# Patient Record
Sex: FEMALE | Employment: UNEMPLOYED | URBAN - METROPOLITAN AREA
[De-identification: names, ages, dates, MRNs, and addresses within clinical notes are randomized per-mention and may not be internally consistent; named-entity substitution may affect disease eponyms.]

---
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
## 2024-06-14 ENCOUNTER — OFFICE VISIT (OUTPATIENT)
Dept: PRIMARY CARE CLINIC | Facility: CLINIC | Age: 19
End: 2024-06-14
Payer: MEDICAID

## 2024-06-14 VITALS
WEIGHT: 135 LBS | DIASTOLIC BLOOD PRESSURE: 81 MMHG | SYSTOLIC BLOOD PRESSURE: 125 MMHG | HEART RATE: 83 BPM | RESPIRATION RATE: 16 BRPM | TEMPERATURE: 97.1 F | BODY MASS INDEX: 27.21 KG/M2 | HEIGHT: 59 IN | OXYGEN SATURATION: 97 %

## 2024-06-14 DIAGNOSIS — R93.5 ABNORMAL ABDOMINAL CT SCAN: Primary | ICD-10-CM

## 2024-06-14 PROCEDURE — 99212 OFFICE O/P EST SF 10 MIN: CPT | Performed by: FAMILY MEDICINE

## 2024-06-14 ASSESSMENT — PATIENT HEALTH QUESTIONNAIRE - PHQ9
SUM OF ALL RESPONSES TO PHQ QUESTIONS 1-9: 0
SUM OF ALL RESPONSES TO PHQ9 QUESTIONS 1 & 2: 0
SUM OF ALL RESPONSES TO PHQ QUESTIONS 1-9: 0
SUM OF ALL RESPONSES TO PHQ QUESTIONS 1-9: 0
2. FEELING DOWN, DEPRESSED OR HOPELESS: NOT AT ALL
SUM OF ALL RESPONSES TO PHQ QUESTIONS 1-9: 0
1. LITTLE INTEREST OR PLEASURE IN DOING THINGS: NOT AT ALL

## 2024-06-14 NOTE — PROGRESS NOTES
\"Have you been to the ER, urgent care clinic since your last visit?  Hospitalized since your last visit?\"    NO    “Have you seen or consulted any other health care providers outside of [facility] since your last visit?”    NO            Click Here for Release of Records Request

## 2024-06-14 NOTE — PROGRESS NOTES
Subjective  Johnna Feldman is an 18 y.o. female who presents for referral.     Following up today.  She was evaluated by GI and advised to follow up with a tertiary center. She's been referred to [facility] but has been unable to schedule.    CT abd : IMPRESSION:  1.  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  2.  Nonspecific mild intrahepatic biliary ductal dilatation, consider  correlation with biliary laboratory values.      Allergies - reviewed:   No Known Allergies      Medications - reviewed:   No current outpatient medications on file.     No current facility-administered medications for this visit.         ROS  CONSTITUTIONAL: Denies fever, chills, unintentional weight loss.  CARDIOVASCULAR: Denies chest pain.  RESPIRATORY: Denies cough, dyspnea, wheezing, hemoptysis.      Physical Exam  /81 (Site: Right Upper Arm, Position: Sitting, Cuff Size: Medium Adult)   Pulse 83   Temp 97.1 °F (36.2 °C) (Temporal)   Resp 16   Ht 1.499 m (4' 11\")   Wt 61.2 kg (135 lb)   LMP 05/18/2024 (Exact Date)   SpO2 97%   BMI 27.27 kg/m²   Physical Exam  Vitals reviewed.   Constitutional:       Appearance: Normal appearance.   HENT:      Head: Normocephalic and atraumatic.   Eyes:      Conjunctiva/sclera: Conjunctivae normal.   Cardiovascular:      Rate and Rhythm: Normal rate and regular rhythm.      Heart sounds: Normal heart sounds.   Pulmonary:      Effort: Pulmonary effort is normal.      Breath sounds: Normal breath sounds.   Abdominal:      General: There is no distension.      Palpations: Abdomen is soft. There is no mass.      Tenderness: There is no abdominal tenderness.   Musculoskeletal:      Right lower leg: No edema.      Left lower leg: No edema.   Neurological:      Mental Status: She is alert.           Assessment/Plan   Diagnosis Orders   1. Abnormal abdominal CT scan  AFL -

## 2024-10-22 ENCOUNTER — TELEPHONE (OUTPATIENT)
Dept: PRIMARY CARE CLINIC | Facility: CLINIC | Age: 19
End: 2024-10-22

## 2024-10-22 NOTE — TELEPHONE ENCOUNTER
Patient's brother called on behalf of the patient, who is on her HIPAA.  Patient would like a referral to an Eye doctor.  Please call Ph#[phone] which is the mother's ph#(Chencho Zayas) and she is on the HIPAA form as well.

## 2024-10-25 NOTE — TELEPHONE ENCOUNTER
Left voicemail asking pt to call office back. Will need to know why pt would like to see the eye